# Patient Record
Sex: MALE | Race: ASIAN | NOT HISPANIC OR LATINO
[De-identification: names, ages, dates, MRNs, and addresses within clinical notes are randomized per-mention and may not be internally consistent; named-entity substitution may affect disease eponyms.]

---

## 2022-12-01 PROBLEM — Z00.00 ENCOUNTER FOR PREVENTIVE HEALTH EXAMINATION: Status: ACTIVE | Noted: 2022-12-01

## 2022-12-05 ENCOUNTER — NON-APPOINTMENT (OUTPATIENT)
Age: 72
End: 2022-12-05

## 2022-12-05 ENCOUNTER — APPOINTMENT (OUTPATIENT)
Dept: HEART AND VASCULAR | Facility: CLINIC | Age: 72
End: 2022-12-05

## 2022-12-05 VITALS
HEIGHT: 67 IN | BODY MASS INDEX: 25.9 KG/M2 | DIASTOLIC BLOOD PRESSURE: 50 MMHG | SYSTOLIC BLOOD PRESSURE: 136 MMHG | WEIGHT: 165 LBS | OXYGEN SATURATION: 98 % | TEMPERATURE: 96.4 F | HEART RATE: 38 BPM

## 2022-12-05 DIAGNOSIS — Z87.891 PERSONAL HISTORY OF NICOTINE DEPENDENCE: ICD-10-CM

## 2022-12-05 DIAGNOSIS — Z83.3 FAMILY HISTORY OF DIABETES MELLITUS: ICD-10-CM

## 2022-12-05 PROCEDURE — 99204 OFFICE O/P NEW MOD 45 MIN: CPT | Mod: 25

## 2022-12-05 PROCEDURE — 93000 ELECTROCARDIOGRAM COMPLETE: CPT

## 2022-12-05 RX ORDER — ROSUVASTATIN CALCIUM 10 MG/1
10 TABLET, FILM COATED ORAL
Qty: 90 | Refills: 0 | Status: COMPLETED | COMMUNITY
Start: 2022-11-03

## 2022-12-05 RX ORDER — INSULIN GLARGINE 100 [IU]/ML
INJECTION, SOLUTION SUBCUTANEOUS
Refills: 0 | Status: ACTIVE | COMMUNITY

## 2022-12-05 RX ORDER — METOPROLOL SUCCINATE 25 MG/1
25 TABLET, EXTENDED RELEASE ORAL
Qty: 90 | Refills: 0 | Status: COMPLETED | COMMUNITY
Start: 2022-11-03

## 2022-12-05 RX ORDER — INSULIN GLARGINE 100 [IU]/ML
100 INJECTION, SOLUTION SUBCUTANEOUS
Qty: 15 | Refills: 0 | Status: COMPLETED | COMMUNITY
Start: 2022-11-17

## 2022-12-05 RX ORDER — LANCETS 30 GAUGE
EACH MISCELLANEOUS
Qty: 100 | Refills: 0 | Status: COMPLETED | COMMUNITY
Start: 2022-08-19

## 2022-12-05 RX ORDER — BLOOD SUGAR DIAGNOSTIC
STRIP MISCELLANEOUS
Qty: 100 | Refills: 0 | Status: COMPLETED | COMMUNITY
Start: 2022-08-19

## 2022-12-05 RX ORDER — AMMONIUM LACTATE 12 %
12 CREAM (GRAM) TOPICAL
Qty: 385 | Refills: 0 | Status: COMPLETED | COMMUNITY
Start: 2022-08-09

## 2022-12-05 NOTE — PHYSICAL EXAM
[Well Developed] : well developed [Well Nourished] : well nourished [No Acute Distress] : no acute distress [Normal Conjunctiva] : normal conjunctiva [Normal Venous Pressure] : normal venous pressure [No Carotid Bruit] : no carotid bruit [Normal S1, S2] : normal S1, S2 [No Murmur] : no murmur [No Rub] : no rub [No Gallop] : no gallop [5th Left ICS - MCL] : palpated at the 5th LICS in the midclavicular line [Normal] : normal [Bradycardia] : bradycardic [Premature Beats] : regular with premature beats [Normal S1] : normal S1 [Normal S2] : normal S2 [Clear Lung Fields] : clear lung fields [Good Air Entry] : good air entry [No Respiratory Distress] : no respiratory distress  [Soft] : abdomen soft [Non Tender] : non-tender [No Masses/organomegaly] : no masses/organomegaly [Normal Bowel Sounds] : normal bowel sounds [Normal Gait] : normal gait [No Edema] : no edema [No Cyanosis] : no cyanosis [No Clubbing] : no clubbing [No Varicosities] : no varicosities [No Rash] : no rash [No Skin Lesions] : no skin lesions [Moves all extremities] : moves all extremities [No Focal Deficits] : no focal deficits [Normal Speech] : normal speech [Alert and Oriented] : alert and oriented [Normal memory] : normal memory

## 2022-12-05 NOTE — CARDIOLOGY SUMMARY
[de-identified] : 12/5/22 SB @ 45 bpm, RBBB, LAFB, blocked PACs [de-identified] : Lorelei HOLLOWAY 8/[birth year] - 9/2/22: SR/SB with PACs with aberrant conduction

## 2022-12-05 NOTE — DISCUSSION/SUMMARY
[FreeTextEntry1] : Mr. Knight is a pleasant 72 year-old gentleman with a past medical history significant for HTN, HLD, DM II, bifascicular block on ECG and frequent PACs who presents for initial evaluation.  His ECG today is significant for RBBB, LAFB and blocked PACs with a long coupling interval.  Given his degree of conduction disease, I recommended that we proceed with a permanent pacemaker as soon as possible given the risk for progression of AV block.  We discussed the procedure in detail including risks, benefits and alternatives. Risks of the procedure have been discussed with the patient including but not limited to: bleeding/pocket hematoma, phlebitis/thrombophlebitis, lead dislodgment, PPM and/or lead infection, device malfunction, implantation site discomfort, pneumothorax, hemothorax, myocardial perforation, anaphylaxis, air embolism, infection, skin erosion, lead fracture, pectoral muscle stimulation, intercostal or diaphragmatic pacing, vascular thrombosis, endocarditis. Risks and benefits of the possible use of medication for conscious/deep sedation have been explained to the patient.  \par \par Recent stress test and echo requested from Dr. Weston.  I advised him to post pone his upcoming travel and explained the risk for progression of AV block resulting in presyncope/syncope that may potentially result in trauma.  He will call the office when he is ready to proceed.  All questions were answered to his apparent satisfaction.  \par \par

## 2022-12-05 NOTE — ADDENDUM
[FreeTextEntry1] : I, Monserrat Crews, hereby attest that the medical record entry for this patient accurately reflects signatures/notations that I made on the Date of Service in my capacity as an Attending Physician when I treated/diagnosed the above patient. I do hereby attest that this information is true, accurate and complete to the best of my knowledge.  I was present for the entire visit and supervised the entire visit and made/agree with the plan as outlined.\par

## 2022-12-05 NOTE — HISTORY OF PRESENT ILLNESS
[FreeTextEntry1] : Mr. Knight is a pleasant 72 year-old gentleman with a past medical history significant for HTN, HLD, DM II, bifascicular block on ECG and frequent PACs who presents for initial evaluation.\par \par Started on Metoprolol ~ 2 months ago for management of palpitations; noted to have frequent PACs on ambulatory telemetry.  Metoprolol was discontinued three weeks ago due to bradycardia in the 40 bpm range.  Notes fatigue over the last several months, worse while on Metoprolol.  He endorses MUKHEREJE with stairs/hills.  No history of presyncope/syncope. \par \par Reports recent stress test was normal.

## 2022-12-23 ENCOUNTER — NON-APPOINTMENT (OUTPATIENT)
Age: 72
End: 2022-12-23

## 2022-12-23 ENCOUNTER — APPOINTMENT (OUTPATIENT)
Dept: ELECTROPHYSIOLOGY | Facility: CLINIC | Age: 72
End: 2022-12-23

## 2022-12-23 VITALS
HEIGHT: 67 IN | BODY MASS INDEX: 25.9 KG/M2 | RESPIRATION RATE: 14 BRPM | SYSTOLIC BLOOD PRESSURE: 166 MMHG | WEIGHT: 165 LBS | DIASTOLIC BLOOD PRESSURE: 63 MMHG | HEART RATE: 46 BPM | OXYGEN SATURATION: 98 %

## 2022-12-23 DIAGNOSIS — I44.7 LEFT BUNDLE-BRANCH BLOCK, UNSPECIFIED: ICD-10-CM

## 2022-12-23 DIAGNOSIS — N20.0 CALCULUS OF KIDNEY: ICD-10-CM

## 2022-12-23 DIAGNOSIS — Z78.9 OTHER SPECIFIED HEALTH STATUS: ICD-10-CM

## 2022-12-23 DIAGNOSIS — I45.10 UNSPECIFIED RIGHT BUNDLE-BRANCH BLOCK: ICD-10-CM

## 2022-12-23 DIAGNOSIS — Z63.4 DISAPPEARANCE AND DEATH OF FAMILY MEMBER: ICD-10-CM

## 2022-12-23 PROCEDURE — 93000 ELECTROCARDIOGRAM COMPLETE: CPT

## 2022-12-23 PROCEDURE — 99204 OFFICE O/P NEW MOD 45 MIN: CPT | Mod: 25

## 2022-12-23 RX ORDER — PNV NO.95/FERROUS FUM/FOLIC AC 28MG-0.8MG
TABLET ORAL
Refills: 0 | Status: ACTIVE | COMMUNITY

## 2022-12-23 RX ORDER — AMLODIPINE BESYLATE 5 MG/1
5 TABLET ORAL DAILY
Qty: 90 | Refills: 0 | Status: ACTIVE | COMMUNITY
Start: 2022-11-29

## 2022-12-23 RX ORDER — ROSUVASTATIN CALCIUM 5 MG/1
5 TABLET, FILM COATED ORAL DAILY
Qty: 90 | Refills: 3 | Status: ACTIVE | COMMUNITY
Start: 2022-11-26

## 2022-12-23 RX ORDER — SITAGLIPTIN 100 MG/1
100 TABLET, FILM COATED ORAL DAILY
Refills: 0 | Status: ACTIVE | COMMUNITY
Start: 2022-11-03

## 2022-12-23 RX ORDER — LOSARTAN POTASSIUM 100 MG/1
100 TABLET, FILM COATED ORAL DAILY
Qty: 90 | Refills: 3 | Status: ACTIVE | COMMUNITY
Start: 2022-12-01

## 2022-12-23 RX ORDER — GLIPIZIDE 10 MG/1
10 TABLET ORAL
Refills: 0 | Status: ACTIVE | COMMUNITY
Start: 2022-11-03

## 2022-12-23 RX ORDER — METFORMIN HYDROCHLORIDE 1000 MG/1
1000 TABLET, COATED ORAL TWICE DAILY
Refills: 0 | Status: ACTIVE | COMMUNITY
Start: 2022-11-03

## 2022-12-23 SDOH — SOCIAL STABILITY - SOCIAL INSECURITY: DISSAPEARANCE AND DEATH OF FAMILY MEMBER: Z63.4

## 2022-12-23 NOTE — HISTORY OF PRESENT ILLNESS
[FreeTextEntry1] : Mr. Chapin Knight is a 72 year old man with hypertension, hyperlipidemia, diabetes who presents today for an initial evaluation for a pacemaker. The patient was treated with Metoprolol for palpitations previously. This medication was stopped due to sinus bradycardia in the 40s. The patient recalls that he was more fatigued while on Metoprolol.  We was noted to have evidence of sinus bradycardia, premature atrial contractions, a first degree AV block, a complete right bundle branch block and a left axis deviation consistent with multilevel conduction system disease. Per documentation from Dr. Weston's office the patient also has had prior evidence of a left bundle branch block.\par \par The patient reports that he occasionally feels lightheaded when raising from a seated position. She also endorses shortness of breath when walking up stairs. No chest pain, near-syncope or syncope. \par \par Vitals from this visit: BP: 166/63, HR: 46, RR: 18, SpO2: 98% on RA

## 2022-12-23 NOTE — CARDIOLOGY SUMMARY
[de-identified] : ECG from 12/23/2022: Normal sinus rhythm with 2:1 AV block, right bundle branch block, RI: 218ms on conducted beats\par ECG from 12/5/2022: Sinus bradycardia with atrial bigeminy, right bundle branch block (QRSd: 140), RI: 212, left axis deviation\par ECG from 11/11/2022: Sinus shonna at 48 with RI: 256, RBBB (QRSd: 132ms), normal axis [de-identified] : [employer] from 8/27/2022-9/2/2022: min HR: 50, mean HR: 85, max HR: 140, no patient triggered events, Mobitz I AV block occurred, 2% APCs, 12% PVCs [de-identified] : Stress test from 12/6/2022: Negative ECG stress test for ischemia after pharmacologic stress, equivocal myocardial perfusion imaging, overall normal LVSF without regional WMAs [de-identified] : TTE from 8/6/2022: EF: 67%, trace TR, normal LA and RA, no ASD, normal RVSF, no pericardial effusion

## 2022-12-23 NOTE — DISCUSSION/SUMMARY
[FreeTextEntry1] : Mr. Chapin Knight is a 72 year old man with HTN, HLD, DM2 who presents today for an initial evaluation for a pacemaker. He has a reported history of a LBBB and was found to have 2:1 AV block with a RBBB today (similar finding to when he saw Dr. Crews on December 5th). I agree with Dr. Crews's assessment that he would benefit from a pacemaker. We discussed the urgency of the device in light of the left and right bundle branch block. He was resistant to going to the ED today. We will schedule him for a PPM next week. If he has any episodes of syncope or pre-syncope he will go to the closest hospital. \par \par I spoke with Dr. Weston, plan for a 2-ch BSC PPM (using extended longevity device given patient's young age), her office with follow the device. Once the PPM is implanted can consider resumption of the B-blocker that Dr. Weston was using for palpitations. \par \par We discussed the risks, benefits and alternatives of a pacemaker implantation. Risks including, but not limited to bleeding, infection, VTE, pneumothorax, perforation, lead dislodgement.  [EKG obtained to assist in diagnosis and management of assessed problem(s)] : EKG obtained to assist in diagnosis and management of assessed problem(s)

## 2022-12-23 NOTE — PHYSICAL EXAM
[Well Developed] : well developed [Well Nourished] : well nourished [No Acute Distress] : no acute distress [Normal Venous Pressure] : normal venous pressure [No Carotid Bruit] : no carotid bruit [Normal S1, S2] : normal S1, S2 [No Murmur] : no murmur [No Rub] : no rub [No Gallop] : no gallop [Clear Lung Fields] : clear lung fields [Good Air Entry] : good air entry [No Respiratory Distress] : no respiratory distress  [Soft] : abdomen soft [Non Tender] : non-tender [No Masses/organomegaly] : no masses/organomegaly [Normal Bowel Sounds] : normal bowel sounds [Normal Gait] : normal gait [No Edema] : no edema [No Cyanosis] : no cyanosis [No Clubbing] : no clubbing [No Varicosities] : no varicosities [No Rash] : no rash [No Skin Lesions] : no skin lesions [Moves all extremities] : moves all extremities [Normal Speech] : normal speech [Alert and Oriented] : alert and oriented [Normal memory] : normal memory [de-identified] : Zane, regular

## 2022-12-27 ENCOUNTER — OUTPATIENT (OUTPATIENT)
Dept: OUTPATIENT SERVICES | Facility: HOSPITAL | Age: 72
LOS: 1 days | End: 2022-12-27
Payer: COMMERCIAL

## 2022-12-27 ENCOUNTER — NON-APPOINTMENT (OUTPATIENT)
Age: 72
End: 2022-12-27

## 2022-12-27 DIAGNOSIS — Z11.52 ENCOUNTER FOR SCREENING FOR COVID-19: ICD-10-CM

## 2022-12-27 LAB — SARS-COV-2 RNA SPEC QL NAA+PROBE: SIGNIFICANT CHANGE UP

## 2022-12-27 PROCEDURE — C9803: CPT

## 2022-12-27 PROCEDURE — U0005: CPT

## 2022-12-27 PROCEDURE — U0003: CPT

## 2022-12-30 ENCOUNTER — INPATIENT (INPATIENT)
Facility: HOSPITAL | Age: 72
LOS: 0 days | Discharge: HOME | DRG: 243 | End: 2022-12-30
Attending: STUDENT IN AN ORGANIZED HEALTH CARE EDUCATION/TRAINING PROGRAM | Admitting: STUDENT IN AN ORGANIZED HEALTH CARE EDUCATION/TRAINING PROGRAM
Payer: COMMERCIAL

## 2022-12-30 ENCOUNTER — TRANSCRIPTION ENCOUNTER (OUTPATIENT)
Age: 72
End: 2022-12-30

## 2022-12-30 VITALS
OXYGEN SATURATION: 97 % | SYSTOLIC BLOOD PRESSURE: 153 MMHG | DIASTOLIC BLOOD PRESSURE: 68 MMHG | RESPIRATION RATE: 18 BRPM | HEART RATE: 67 BPM | TEMPERATURE: 98 F

## 2022-12-30 VITALS — WEIGHT: 164.91 LBS | HEIGHT: 67 IN

## 2022-12-30 DIAGNOSIS — I44.2 ATRIOVENTRICULAR BLOCK, COMPLETE: ICD-10-CM

## 2022-12-30 DIAGNOSIS — Z90.89 ACQUIRED ABSENCE OF OTHER ORGANS: Chronic | ICD-10-CM

## 2022-12-30 DIAGNOSIS — Z98.890 OTHER SPECIFIED POSTPROCEDURAL STATES: Chronic | ICD-10-CM

## 2022-12-30 LAB
ANION GAP SERPL CALC-SCNC: 11 MMOL/L — SIGNIFICANT CHANGE UP (ref 5–17)
APTT BLD: 30.8 SEC — SIGNIFICANT CHANGE UP (ref 27.5–35.5)
BLD GP AB SCN SERPL QL: NEGATIVE — SIGNIFICANT CHANGE UP
BUN SERPL-MCNC: 17 MG/DL — SIGNIFICANT CHANGE UP (ref 7–23)
CALCIUM SERPL-MCNC: 9.8 MG/DL — SIGNIFICANT CHANGE UP (ref 8.4–10.5)
CHLORIDE SERPL-SCNC: 107 MMOL/L — SIGNIFICANT CHANGE UP (ref 96–108)
CO2 SERPL-SCNC: 24 MMOL/L — SIGNIFICANT CHANGE UP (ref 22–31)
CREAT SERPL-MCNC: 1.14 MG/DL — SIGNIFICANT CHANGE UP (ref 0.5–1.3)
EGFR: 69 ML/MIN/1.73M2 — SIGNIFICANT CHANGE UP
GLUCOSE BLDC GLUCOMTR-MCNC: 123 MG/DL — HIGH (ref 70–99)
GLUCOSE BLDC GLUCOMTR-MCNC: 133 MG/DL — HIGH (ref 70–99)
GLUCOSE SERPL-MCNC: 149 MG/DL — HIGH (ref 70–99)
HCT VFR BLD CALC: 42.6 % — SIGNIFICANT CHANGE UP (ref 39–50)
HGB BLD-MCNC: 13.8 G/DL — SIGNIFICANT CHANGE UP (ref 13–17)
INR BLD: 0.98 RATIO — SIGNIFICANT CHANGE UP (ref 0.88–1.16)
MCHC RBC-ENTMCNC: 30.5 PG — SIGNIFICANT CHANGE UP (ref 27–34)
MCHC RBC-ENTMCNC: 32.4 GM/DL — SIGNIFICANT CHANGE UP (ref 32–36)
MCV RBC AUTO: 94.2 FL — SIGNIFICANT CHANGE UP (ref 80–100)
NRBC # BLD: 0 /100 WBCS — SIGNIFICANT CHANGE UP (ref 0–0)
PLATELET # BLD AUTO: 215 K/UL — SIGNIFICANT CHANGE UP (ref 150–400)
POTASSIUM SERPL-MCNC: 4.6 MMOL/L — SIGNIFICANT CHANGE UP (ref 3.5–5.3)
POTASSIUM SERPL-SCNC: 4.6 MMOL/L — SIGNIFICANT CHANGE UP (ref 3.5–5.3)
PROTHROM AB SERPL-ACNC: 11.3 SEC — SIGNIFICANT CHANGE UP (ref 10.5–13.4)
RBC # BLD: 4.52 M/UL — SIGNIFICANT CHANGE UP (ref 4.2–5.8)
RBC # FLD: 14 % — SIGNIFICANT CHANGE UP (ref 10.3–14.5)
RH IG SCN BLD-IMP: POSITIVE — SIGNIFICANT CHANGE UP
RH IG SCN BLD-IMP: POSITIVE — SIGNIFICANT CHANGE UP
SODIUM SERPL-SCNC: 142 MMOL/L — SIGNIFICANT CHANGE UP (ref 135–145)
WBC # BLD: 9.05 K/UL — SIGNIFICANT CHANGE UP (ref 3.8–10.5)
WBC # FLD AUTO: 9.05 K/UL — SIGNIFICANT CHANGE UP (ref 3.8–10.5)

## 2022-12-30 PROCEDURE — 71046 X-RAY EXAM CHEST 2 VIEWS: CPT | Mod: 26

## 2022-12-30 PROCEDURE — C1892: CPT

## 2022-12-30 PROCEDURE — C1889: CPT

## 2022-12-30 PROCEDURE — 33208 INSRT HEART PM ATRIAL & VENT: CPT | Mod: KX

## 2022-12-30 PROCEDURE — C1785: CPT

## 2022-12-30 PROCEDURE — 36415 COLL VENOUS BLD VENIPUNCTURE: CPT

## 2022-12-30 PROCEDURE — 85027 COMPLETE CBC AUTOMATED: CPT

## 2022-12-30 PROCEDURE — C1769: CPT

## 2022-12-30 PROCEDURE — 85610 PROTHROMBIN TIME: CPT

## 2022-12-30 PROCEDURE — 80048 BASIC METABOLIC PNL TOTAL CA: CPT

## 2022-12-30 PROCEDURE — 86850 RBC ANTIBODY SCREEN: CPT

## 2022-12-30 PROCEDURE — 71046 X-RAY EXAM CHEST 2 VIEWS: CPT

## 2022-12-30 PROCEDURE — 86901 BLOOD TYPING SEROLOGIC RH(D): CPT

## 2022-12-30 PROCEDURE — C1887: CPT

## 2022-12-30 PROCEDURE — 82962 GLUCOSE BLOOD TEST: CPT

## 2022-12-30 PROCEDURE — 85730 THROMBOPLASTIN TIME PARTIAL: CPT

## 2022-12-30 PROCEDURE — 86900 BLOOD TYPING SEROLOGIC ABO: CPT

## 2022-12-30 PROCEDURE — C1898: CPT

## 2022-12-30 RX ORDER — ROSUVASTATIN CALCIUM 5 MG/1
1 TABLET ORAL
Qty: 0 | Refills: 0 | DISCHARGE

## 2022-12-30 RX ORDER — INSULIN GLARGINE 100 [IU]/ML
18 INJECTION, SOLUTION SUBCUTANEOUS
Qty: 0 | Refills: 0 | DISCHARGE

## 2022-12-30 RX ORDER — CHLORHEXIDINE GLUCONATE 213 G/1000ML
1 SOLUTION TOPICAL ONCE
Refills: 0 | Status: DISCONTINUED | OUTPATIENT
Start: 2022-12-30 | End: 2022-12-30

## 2022-12-30 RX ORDER — OXYCODONE AND ACETAMINOPHEN 5; 325 MG/1; MG/1
1 TABLET ORAL EVERY 6 HOURS
Refills: 0 | Status: DISCONTINUED | OUTPATIENT
Start: 2022-12-30 | End: 2022-12-30

## 2022-12-30 RX ORDER — SITAGLIPTIN 50 MG/1
1 TABLET, FILM COATED ORAL
Qty: 0 | Refills: 0 | DISCHARGE

## 2022-12-30 RX ORDER — LOSARTAN POTASSIUM 100 MG/1
1 TABLET, FILM COATED ORAL
Qty: 0 | Refills: 0 | DISCHARGE

## 2022-12-30 RX ORDER — CEFAZOLIN SODIUM 1 G
2000 VIAL (EA) INJECTION ONCE
Refills: 0 | Status: DISCONTINUED | OUTPATIENT
Start: 2022-12-30 | End: 2022-12-30

## 2022-12-30 RX ORDER — PREGABALIN 225 MG/1
1 CAPSULE ORAL
Qty: 0 | Refills: 0 | DISCHARGE

## 2022-12-30 RX ORDER — CEPHALEXIN 500 MG
1 CAPSULE ORAL
Qty: 8 | Refills: 0
Start: 2022-12-30 | End: 2022-12-31

## 2022-12-30 RX ORDER — METFORMIN HYDROCHLORIDE 850 MG/1
1 TABLET ORAL
Qty: 0 | Refills: 0 | DISCHARGE

## 2022-12-30 RX ORDER — OXYCODONE AND ACETAMINOPHEN 5; 325 MG/1; MG/1
1 TABLET ORAL
Qty: 12 | Refills: 0
Start: 2022-12-30 | End: 2023-01-01

## 2022-12-30 RX ORDER — AMLODIPINE BESYLATE 2.5 MG/1
1 TABLET ORAL
Qty: 0 | Refills: 0 | DISCHARGE

## 2022-12-30 RX ORDER — CEPHALEXIN 500 MG
250 CAPSULE ORAL EVERY 6 HOURS
Refills: 0 | Status: DISCONTINUED | OUTPATIENT
Start: 2022-12-30 | End: 2022-12-30

## 2022-12-30 RX ADMIN — Medication 250 MILLIGRAM(S): at 19:40

## 2022-12-30 NOTE — PATIENT PROFILE ADULT - FALL HARM RISK - UNIVERSAL INTERVENTIONS
Bed in lowest position, wheels locked, appropriate side rails in place/Call bell, personal items and telephone in reach/Instruct patient to call for assistance before getting out of bed or chair/Non-slip footwear when patient is out of bed/[place] to call system/Physically safe environment - no spills, clutter or unnecessary equipment/Purposeful Proactive Rounding/Room/bathroom lighting operational, light cord in reach

## 2022-12-30 NOTE — H&P CARDIOLOGY - NSICDXPASTMEDICALHX_GEN_ALL_CORE_FT
PAST MEDICAL HISTORY:  DM type 2, not at goal     HLD (hyperlipidemia)     HTN (hypertension)     Second degree heart block

## 2022-12-30 NOTE — H&P CARDIOLOGY - HISTORY OF PRESENT ILLNESS
Mr. Chapin Knight is a 72 year old man with HTN, HLD, DM2 who presents today for an initial evaluation for a pacemaker. He has a reported history of a LBBB and was found to have 2:1 AV block with a RBBB today (similar finding to when he saw Dr. Crews on December 5th). I agree with Dr. Crews's assessment that he would benefit from a pacemaker. We discussed the urgency of the device in light of the left and right bundle branch block. He was resistant to going to the ED today. We will schedule him for a PPM next week. If he has any episodes of syncope or pre-syncope he will go to the closest hospital.     I spoke with Dr. Weston, plan for a 2-ch C PPM (using extended longevity device given patient's young age), her office with follow the device. Once the PPM is implanted can consider resumption of the B-blocker that Dr. Weston was using for palpitations.     We discussed the risks, benefits and alternatives of a pacemaker implantation. Risks including, but not limited to bleeding, infection, VTE, pneumothorax, perforation, lead dislodgement.       EKG obtained to assist in diagnosis and management of assessed problem(s).      72 year old man with HTN, HLD, DM2, RBBB,  who presents today for dual chmaber pacemaker. Patient was recently diagnosed with second degree HB, pt denies chest pain, SOB/ syncope. Seen by Dr. Harman Wood and referred for PM implant.        72 year old man with HTN, HLD, DM2, RBBB,  who presents today for dual chmaber pacemaker. Patient was recently diagnosed with second degree HB, pt denies chest pain, SOB/ syncope, but c/o lightheadedness, especially when he climb stairs. Seen by Dr. Harman Wood and referred for PM implant.   Denies any implanted cardiac device.

## 2022-12-30 NOTE — ASU DISCHARGE PLAN (ADULT/PEDIATRIC) - PROVIDER TOKENS
PROVIDER:[TOKEN:[[id number]],SCHEDULEDAPPT:[01/18/2023],SCHEDULEDAPPTTIME:[11:40 AM],ESTABLISHEDPATIENT:[T]]

## 2022-12-30 NOTE — ASU DISCHARGE PLAN (ADULT/PEDIATRIC) - NS MD DC FALL RISK RISK
For information on Fall & Injury Prevention, visit: [email or web address] OR  [email or web address] OR  https://www.cdc.gov/steadi/patient.html

## 2022-12-30 NOTE — H&P CARDIOLOGY - NS ATTEND AMEND GEN_ALL_CORE FT
Patient seen at bedside in [facility]. I saw the patient in the office 1 week ago today. No changes in history in the past week. Consented for 2-ch PPM - risks including, but not limited to bleeding, infection, pneumothorax, perforation, lead dislodgement, pain.     AJAY Yates

## 2022-12-30 NOTE — ASU DISCHARGE PLAN (ADULT/PEDIATRIC) - ASU DC SPECIAL INSTRUCTIONSFT
WOUND CARE:  Do NOT scrub, rub, or pick at your incision site  AFTER 3 DAYS you may SHOWER  - use mild soap and gentle warm, water stream, pat dry  DO NOT apply lotions, creams, ointments, powder, perfumes to your incision site  DO NOT SOAK your site for 4-6 weeks ( no baths, no pools, no tubs, etc...)  wear loose clothing around site for 1-2 weeks  IF surgical tape was used DO NOT remove the strips, they will fall off after 7days, if glue was used, it will naturally fall off within 3 weeks  if staples were used, they will be removed in 7-10 days by your doctor    ACTIVITY:  for 2 weeks AFTER  your procedure  - DO NOT RAISE your arm above shoulder level ( on the same side of your incision)  for 4 weeks AFTER your procedure   - DO NOT LIFT anything 10 lbs or heavier ( on the side of your implant)   - certain activities may be limited longer, those that involve swinging your arm, and will be discussed with your EP doctor  DO NOT DRIVE until your EP Doctor or nurse practitioner/ physician assistant states it is safe to do so  A follow up appointment in 7-14 days will be arranged before your discharge    ID CARD:   you will receive an ID CARD and device company booklet   - please carry that card with you at all times    ***CALL YOUR DOCTOR ***  IF you have fever, chills, body aches, or severe pain, swelling, redness, heat, yellow drainage from your incision site  IF bleeding  or significant new swelling from your puncture site  IF your experience lightheadedness, dizziness, or fainting spell.  IF unable to ge tin contact with your doctor, you may call the Cardiology Office at [facility] at [phone] WOUND CARE:  Do NOT scrub, rub, or pick at your incision site  AFTER 1 DAY you may SHOWER  - use mild soap and gentle warm, water stream, pat dry  DO NOT apply lotions, creams, ointments, powder, perfumes to your incision site  DO NOT SOAK your site for 4 weeks ( no baths, no pools, no tubs, etc...)  wear loose clothing around site for 1-2 weeks  IF surgical tape was used DO NOT remove the strips, they will fall off after 7days, if glue was used, it will naturally fall off within 3 weeks    ACTIVITY:  for 2 weeks AFTER  your procedure  - DO NOT RAISE your arm above shoulder level ( on the same side of your incision)  for 4 weeks AFTER your procedure   - DO NOT LIFT anything 10 lbs or heavier ( on the side of your implant)   - certain activities may be limited longer, those that involve swinging your arm, and will be discussed with your EP doctor  DO NOT DRIVE until your EP Doctor or nurse practitioner/ physician assistant states it is safe to do so  A follow up appointment in 7-14 days will be arranged before your discharge    ID CARD:   you will receive an ID CARD and device company booklet   - please carry that card with you at all times    ***CALL YOUR DOCTOR ***  IF you have fever, chills, body aches, or severe pain, swelling, redness, heat, yellow drainage from your incision site  IF bleeding  or significant new swelling from your puncture site  IF your experience lightheadedness, dizziness, or fainting spell.  IF unable to ge tin contact with your doctor, you may call the Cardiology Office at [facility] at [phone]

## 2022-12-31 ENCOUNTER — NON-APPOINTMENT (OUTPATIENT)
Age: 72
End: 2022-12-31

## 2023-01-04 PROBLEM — E11.9 TYPE 2 DIABETES MELLITUS WITHOUT COMPLICATIONS: Chronic | Status: ACTIVE | Noted: 2022-12-30

## 2023-01-04 PROBLEM — I44.1 ATRIOVENTRICULAR BLOCK, SECOND DEGREE: Chronic | Status: ACTIVE | Noted: 2022-12-30

## 2023-01-04 PROBLEM — I10 ESSENTIAL (PRIMARY) HYPERTENSION: Chronic | Status: ACTIVE | Noted: 2022-12-30

## 2023-01-04 PROBLEM — E78.5 HYPERLIPIDEMIA, UNSPECIFIED: Chronic | Status: ACTIVE | Noted: 2022-12-30

## 2023-01-18 ENCOUNTER — RESULT CHARGE (OUTPATIENT)
Age: 73
End: 2023-01-18

## 2023-01-18 ENCOUNTER — APPOINTMENT (OUTPATIENT)
Dept: ELECTROPHYSIOLOGY | Facility: CLINIC | Age: 73
End: 2023-01-18
Payer: MEDICARE

## 2023-01-18 VITALS
HEART RATE: 86 BPM | HEIGHT: 67 IN | SYSTOLIC BLOOD PRESSURE: 132 MMHG | OXYGEN SATURATION: 97 % | WEIGHT: 165 LBS | BODY MASS INDEX: 25.9 KG/M2 | DIASTOLIC BLOOD PRESSURE: 71 MMHG

## 2023-01-18 PROCEDURE — 93280 PM DEVICE PROGR EVAL DUAL: CPT

## 2023-01-18 PROCEDURE — 99024 POSTOP FOLLOW-UP VISIT: CPT

## 2023-01-18 PROCEDURE — 93000 ELECTROCARDIOGRAM COMPLETE: CPT | Mod: 59

## 2023-01-19 ENCOUNTER — NON-APPOINTMENT (OUTPATIENT)
Age: 73
End: 2023-01-19

## 2023-01-30 ENCOUNTER — TRANSCRIPTION ENCOUNTER (OUTPATIENT)
Age: 73
End: 2023-01-30

## 2023-03-01 ENCOUNTER — APPOINTMENT (OUTPATIENT)
Dept: CARDIOLOGY | Facility: CLINIC | Age: 73
End: 2023-03-01
Payer: MEDICARE

## 2023-03-01 ENCOUNTER — NON-APPOINTMENT (OUTPATIENT)
Age: 73
End: 2023-03-01

## 2023-03-01 VITALS
DIASTOLIC BLOOD PRESSURE: 62 MMHG | BODY MASS INDEX: 26 KG/M2 | HEART RATE: 103 BPM | WEIGHT: 166 LBS | OXYGEN SATURATION: 96 % | SYSTOLIC BLOOD PRESSURE: 102 MMHG

## 2023-03-01 PROCEDURE — 93000 ELECTROCARDIOGRAM COMPLETE: CPT

## 2023-03-01 PROCEDURE — 99204 OFFICE O/P NEW MOD 45 MIN: CPT | Mod: 25

## 2023-03-01 NOTE — HISTORY OF PRESENT ILLNESS
[FreeTextEntry1] : Mr. Chapin Knight is a 72 year old man with hypertension, hyperlipidemia, diabetes who presents today for an initial evaluation after pacemaker implantation on 12/30/2022 at [facility]. Subsequent interrogation showed that he is 100% , but he is not pacemaker dependent. \par In October 2022, patient had palpitations for which he was prescribed metoprolol. This medication was stopped due to sinus bradycardia in the 40s. The patient recalls that he was more fatigued while on metoprolol. We was noted to have evidence of sinus bradycardia, premature atrial contractions, a first degree AV block, a complete right bundle branch block and a left axis deviation consistent with multilevel conduction system disease. Per documentation from Dr. Weston's office the patient also has had prior evidence of a left bundle branch block. PPM implanted electively at [facility] on 12/30/2022. \par \par Patient felt occasional light headedness at rest prior to his PPM implant, but he never had syncope.\par Since PPM implant, his palpitations have resolved, and he has remained off the metoprolol since prior to PPM implant. \par \par He walks for exercise. He does 3000 - 4000 steps per day, but he plans to increase this as the weather gets nicer.

## 2023-03-01 NOTE — CARDIOLOGY SUMMARY
[de-identified] : ECG from 12/23/2022: Normal sinus rhythm with 2:1 AV block, right bundle branch block, CT: 218ms on conducted beats\par ECG from 12/5/2022: Sinus bradycardia with atrial bigeminy, right bundle branch block (QRSd: 140), CT: 212, left axis deviation\par ECG from 11/11/2022: Sinus shonna at 48 with CT: 256, RBBB (QRSd: 132ms), normal axis [de-identified] : [employer] from 8/27/2022-9/2/2022: min HR: 50, mean HR: 85, max HR: 140, no patient triggered events, Mobitz I AV block occurred, 2% APCs, 12% PVCs [de-identified] : Stress test from 12/6/2022: Negative ECG stress test for ischemia after pharmacologic stress, equivocal myocardial perfusion imaging, overall normal LVSF without regional WMAs [de-identified] : TTE from 8/6/2022: EF: 67%, trace TR, normal LA and RA, no ASD, normal RVSF, no pericardial effusion [de-identified] : 12/30/2022 [employer] Accolade MRI EL dual chamber PPM placed by Gerson Yates MD at [facility]

## 2023-03-01 NOTE — DISCUSSION/SUMMARY
[EKG obtained to assist in diagnosis and management of assessed problem(s)] : EKG obtained to assist in diagnosis and management of assessed problem(s) [FreeTextEntry1] : Patient is a 72 year-old with cardiovascular risk factors as above and recent PPM placement for heart block.\par \par Continue diabetes management. Encouraged adding resistance training to his regimen with the goal of adding lean muscle to address elevated A1c despite polypharmacy. He is aware that he should not lift heavy weights because of the pacemaker, but light weights should be fine.

## 2023-04-19 ENCOUNTER — APPOINTMENT (OUTPATIENT)
Dept: ELECTROPHYSIOLOGY | Facility: HOSPITAL | Age: 73
End: 2023-04-19
Payer: MEDICARE

## 2023-04-19 ENCOUNTER — APPOINTMENT (OUTPATIENT)
Dept: ELECTROPHYSIOLOGY | Facility: CLINIC | Age: 73
End: 2023-04-19

## 2023-04-19 VITALS
DIASTOLIC BLOOD PRESSURE: 78 MMHG | HEIGHT: 67 IN | HEART RATE: 95 BPM | OXYGEN SATURATION: 98 % | SYSTOLIC BLOOD PRESSURE: 143 MMHG

## 2023-04-19 PROCEDURE — 93280 PM DEVICE PROGR EVAL DUAL: CPT | Mod: 26

## 2023-04-19 PROCEDURE — 93010 ELECTROCARDIOGRAM REPORT: CPT | Mod: 59

## 2023-04-24 ENCOUNTER — NON-APPOINTMENT (OUTPATIENT)
Age: 73
End: 2023-04-24

## 2023-07-19 ENCOUNTER — NON-APPOINTMENT (OUTPATIENT)
Age: 73
End: 2023-07-19

## 2023-07-19 ENCOUNTER — APPOINTMENT (OUTPATIENT)
Dept: ELECTROPHYSIOLOGY | Facility: CLINIC | Age: 73
End: 2023-07-19
Payer: MEDICARE

## 2023-07-19 PROCEDURE — 93294 REM INTERROG EVL PM/LDLS PM: CPT

## 2023-07-19 PROCEDURE — 93296 REM INTERROG EVL PM/IDS: CPT

## 2023-09-06 ENCOUNTER — NON-APPOINTMENT (OUTPATIENT)
Age: 73
End: 2023-09-06

## 2023-09-06 ENCOUNTER — APPOINTMENT (OUTPATIENT)
Dept: CARDIOLOGY | Facility: CLINIC | Age: 73
End: 2023-09-06
Payer: MEDICARE

## 2023-09-06 VITALS
OXYGEN SATURATION: 95 % | DIASTOLIC BLOOD PRESSURE: 69 MMHG | WEIGHT: 168 LBS | SYSTOLIC BLOOD PRESSURE: 109 MMHG | HEART RATE: 87 BPM | BODY MASS INDEX: 26.31 KG/M2

## 2023-09-06 DIAGNOSIS — I49.9 CARDIAC ARRHYTHMIA, UNSPECIFIED: ICD-10-CM

## 2023-09-06 PROCEDURE — 99214 OFFICE O/P EST MOD 30 MIN: CPT | Mod: 25

## 2023-09-06 PROCEDURE — 93000 ELECTROCARDIOGRAM COMPLETE: CPT

## 2023-09-06 NOTE — REASON FOR VISIT
[Arrhythmia/ECG Abnorrmalities] : arrhythmia/ECG abnormalities [FreeTextEntry1] : September 2023 - Patient returns today for follow-up in his usual state of health. He has no new complaints. Recent labs were within normal limits. A1c seen to be 7.4% and LDL 54 mg/dL.

## 2023-09-06 NOTE — HISTORY OF PRESENT ILLNESS
[FreeTextEntry1] : Mr. Chapin Knight is a 72 year old man with hypertension, hyperlipidemia, diabetes who presents today for an initial evaluation after pacemaker implantation on 12/30/2022 at [facility]. Subsequent interrogation showed that he is 100% , but he is not pacemaker dependent. \par  In October 2022, patient had palpitations for which he was prescribed metoprolol. This medication was stopped due to sinus bradycardia in the 40s. The patient recalls that he was more fatigued while on metoprolol. We was noted to have evidence of sinus bradycardia, premature atrial contractions, a first degree AV block, a complete right bundle branch block and a left axis deviation consistent with multilevel conduction system disease. Per documentation from Dr. Weston's office the patient also has had prior evidence of a left bundle branch block. PPM implanted electively at [facility] on 12/30/2022. \par  \par  Patient felt occasional light headedness at rest prior to his PPM implant, but he never had syncope.\par  Since PPM implant, his palpitations have resolved, and he has remained off the metoprolol since prior to PPM implant. \par  \par  He walks for exercise. He does 3000 - 4000 steps per day, but he plans to increase this as the weather gets nicer.

## 2023-09-06 NOTE — DISCUSSION/SUMMARY
[EKG obtained to assist in diagnosis and management of assessed problem(s)] : EKG obtained to assist in diagnosis and management of assessed problem(s) [FreeTextEntry1] : Patient is a 73 year-old with cardiovascular risk factors as above and PPM placement for heart block (December 2022).  Continue diabetes management. Encouraged adding resistance training to his regimen with the goal of adding lean muscle to address elevated A1c despite polypharmacy. He is aware that he should not lift heavy weights because of the pacemaker, but light weights should be fine.

## 2023-09-06 NOTE — CARDIOLOGY SUMMARY
[de-identified] : ECG from 12/23/2022: Normal sinus rhythm with 2:1 AV block, right bundle branch block, [place]: 218ms on conducted beats\par  ECG from 12/5/2022: Sinus bradycardia with atrial bigeminy, right bundle branch block (QRSd: 140), NY: 212, left axis deviation\par  ECG from 11/11/2022: Sinus shonna at 48 with NY: 256, RBBB (QRSd: 132ms), normal axis [de-identified] : [employer] from 8/27/2022-9/2/2022: min HR: 50, mean HR: 85, max HR: 140, no patient triggered events, Mobitz I AV block occurred, 2% APCs, 12% PVCs [de-identified] : Stress test from 12/6/2022: Negative ECG stress test for ischemia after pharmacologic stress, equivocal myocardial perfusion imaging, overall normal LVSF without regional WMAs [de-identified] : TTE from 8/6/2022: EF: 67%, trace TR, normal LA and RA, no ASD, normal RVSF, no pericardial effusion [de-identified] : 12/30/2022 [place] [employer] Accolade MRI EL dual chamber PPM placed by Gerson Yates MD at [facility]

## 2023-09-14 PROBLEM — I49.9 ARRHYTHMIA: Status: ACTIVE | Noted: 2022-12-23

## 2023-10-18 ENCOUNTER — APPOINTMENT (OUTPATIENT)
Dept: ELECTROPHYSIOLOGY | Facility: CLINIC | Age: 73
End: 2023-10-18
Payer: MEDICARE

## 2023-10-18 ENCOUNTER — NON-APPOINTMENT (OUTPATIENT)
Age: 73
End: 2023-10-18

## 2023-10-18 PROCEDURE — 93294 REM INTERROG EVL PM/LDLS PM: CPT

## 2023-10-18 PROCEDURE — 93296 REM INTERROG EVL PM/IDS: CPT

## 2024-01-17 ENCOUNTER — NON-APPOINTMENT (OUTPATIENT)
Age: 74
End: 2024-01-17

## 2024-01-17 ENCOUNTER — APPOINTMENT (OUTPATIENT)
Dept: ELECTROPHYSIOLOGY | Facility: CLINIC | Age: 74
End: 2024-01-17
Payer: MEDICARE

## 2024-01-17 PROCEDURE — 93294 REM INTERROG EVL PM/LDLS PM: CPT

## 2024-01-17 PROCEDURE — 93296 REM INTERROG EVL PM/IDS: CPT

## 2024-03-06 ENCOUNTER — NON-APPOINTMENT (OUTPATIENT)
Age: 74
End: 2024-03-06

## 2024-03-06 ENCOUNTER — APPOINTMENT (OUTPATIENT)
Dept: CARDIOLOGY | Facility: CLINIC | Age: 74
End: 2024-03-06
Payer: MEDICARE

## 2024-03-06 VITALS
SYSTOLIC BLOOD PRESSURE: 106 MMHG | HEART RATE: 96 BPM | HEIGHT: 67 IN | WEIGHT: 176 LBS | BODY MASS INDEX: 27.62 KG/M2 | DIASTOLIC BLOOD PRESSURE: 72 MMHG | OXYGEN SATURATION: 97 %

## 2024-03-06 DIAGNOSIS — E11.9 TYPE 2 DIABETES MELLITUS W/OUT COMPLICATIONS: ICD-10-CM

## 2024-03-06 DIAGNOSIS — E78.5 HYPERLIPIDEMIA, UNSPECIFIED: ICD-10-CM

## 2024-03-06 DIAGNOSIS — I10 ESSENTIAL (PRIMARY) HYPERTENSION: ICD-10-CM

## 2024-03-06 DIAGNOSIS — I44.30 UNSPECIFIED ATRIOVENTRICULAR BLOCK: ICD-10-CM

## 2024-03-06 PROCEDURE — 99214 OFFICE O/P EST MOD 30 MIN: CPT | Mod: 25

## 2024-03-06 PROCEDURE — 93000 ELECTROCARDIOGRAM COMPLETE: CPT

## 2024-03-06 PROCEDURE — G2211 COMPLEX E/M VISIT ADD ON: CPT | Mod: NC,1L

## 2024-03-06 NOTE — PHYSICAL EXAM
[Well Developed] : well developed [Well Nourished] : well nourished [No Acute Distress] : no acute distress [Normal Conjunctiva] : normal conjunctiva [Normal Venous Pressure] : normal venous pressure [No Carotid Bruit] : no carotid bruit [Normal S1, S2] : normal S1, S2 [No Rub] : no rub [No Murmur] : no murmur [No Gallop] : no gallop [Good Air Entry] : good air entry [Clear Lung Fields] : clear lung fields [No Respiratory Distress] : no respiratory distress  [Soft] : abdomen soft [Non Tender] : non-tender [No Masses/organomegaly] : no masses/organomegaly [Normal Bowel Sounds] : normal bowel sounds [Normal Gait] : normal gait [No Edema] : no edema [No Cyanosis] : no cyanosis [No Clubbing] : no clubbing [No Varicosities] : no varicosities [No Rash] : no rash [No Skin Lesions] : no skin lesions [Moves all extremities] : moves all extremities [No Focal Deficits] : no focal deficits [Normal Speech] : normal speech [Alert and Oriented] : alert and oriented [Normal memory] : normal memory

## 2024-03-08 PROBLEM — I44.30 HEART BLOCK, AV: Status: ACTIVE | Noted: 2022-12-23

## 2024-03-08 PROBLEM — I10 ESSENTIAL HYPERTENSION: Status: ACTIVE | Noted: 2022-12-23

## 2024-03-08 PROBLEM — E11.9 DIABETES MELLITUS, TYPE 2: Status: ACTIVE | Noted: 2022-12-23

## 2024-03-08 PROBLEM — E78.5 HYPERLIPIDEMIA: Status: ACTIVE | Noted: 2022-12-23

## 2024-03-08 NOTE — CARDIOLOGY SUMMARY
[de-identified] : ECG from 12/23/2022: Normal sinus rhythm with 2:1 AV block, right bundle branch block, [place]: 218ms on conducted beats\par  ECG from 12/5/2022: Sinus bradycardia with atrial bigeminy, right bundle branch block (QRSd: 140), NJ: 212, left axis deviation\par  ECG from 11/11/2022: Sinus shonna at 48 with NJ: 256, RBBB (QRSd: 132ms), normal axis [de-identified] : Stress test from 12/6/2022: Negative ECG stress test for ischemia after pharmacologic stress, equivocal myocardial perfusion imaging, overall normal LVSF without regional WMAs [de-identified] : [employer] from 8/27/2022-9/2/2022: min HR: 50, mean HR: 85, max HR: 140, no patient triggered events, Mobitz I AV block occurred, 2% APCs, 12% PVCs [de-identified] : 12/30/2022 [place] [employer] Accolade MRI EL dual chamber PPM placed by Gerson Yates MD at [facility] [de-identified] : TTE from 8/6/2022: EF: 67%, trace TR, normal LA and RA, no ASD, normal RVSF, no pericardial effusion

## 2024-03-08 NOTE — REASON FOR VISIT
[Arrhythmia/ECG Abnorrmalities] : arrhythmia/ECG abnormalities [FreeTextEntry1] : March 2024 - Patient returns today for follow-up in his usual state of health.  Recent labs showed elevated A1c at 8.3% and LDL that is well controlled. He recently returned from a trip to [place] with his two brothers. They are all in their 70s now.

## 2024-03-08 NOTE — HISTORY OF PRESENT ILLNESS
[FreeTextEntry1] : Mr. Chapin Knight is a 73 year old man with hypertension, hyperlipidemia, diabetes who presents today for an initial evaluation after pacemaker implantation on 12/30/2022 at [facility]. Subsequent interrogation showed that he is 100% , but he is not pacemaker dependent.  In October 2022, patient had palpitations for which he was prescribed metoprolol. This medication was stopped due to sinus bradycardia in the 40s. The patient recalls that he was more fatigued while on metoprolol. We was noted to have evidence of sinus bradycardia, premature atrial contractions, a first degree AV block, a complete right bundle branch block and a left axis deviation consistent with multilevel conduction system disease. Per documentation from Dr. Weston's office the patient also has had prior evidence of a left bundle branch block. PPM implanted electively at [facility] on 12/30/2022.   Patient felt occasional light headedness at rest prior to his PPM implant, but he never had syncope. Since PPM implant, his palpitations have resolved, and he has remained off the metoprolol since prior to PPM implant.   He walks for exercise. He does 3000 - 4000 steps per day, but he plans to increase this as the weather gets nicer.  September 2023 - Patient returns today for follow-up in his usual state of health. He has no new complaints. Recent labs were within normal limits. A1c seen to be 7.4% and LDL 54 mg/dL.

## 2024-04-24 ENCOUNTER — APPOINTMENT (OUTPATIENT)
Dept: ELECTROPHYSIOLOGY | Facility: CLINIC | Age: 74
End: 2024-04-24
Payer: MEDICARE

## 2024-04-24 ENCOUNTER — NON-APPOINTMENT (OUTPATIENT)
Age: 74
End: 2024-04-24

## 2024-04-24 VITALS
SYSTOLIC BLOOD PRESSURE: 129 MMHG | BODY MASS INDEX: 26.16 KG/M2 | DIASTOLIC BLOOD PRESSURE: 77 MMHG | OXYGEN SATURATION: 97 % | HEART RATE: 81 BPM | WEIGHT: 167 LBS

## 2024-04-24 PROCEDURE — 93280 PM DEVICE PROGR EVAL DUAL: CPT

## 2024-04-24 PROCEDURE — 93000 ELECTROCARDIOGRAM COMPLETE: CPT | Mod: 59

## 2024-07-23 ENCOUNTER — NON-APPOINTMENT (OUTPATIENT)
Age: 74
End: 2024-07-23

## 2024-07-24 ENCOUNTER — APPOINTMENT (OUTPATIENT)
Dept: ELECTROPHYSIOLOGY | Facility: CLINIC | Age: 74
End: 2024-07-24
Payer: MEDICARE

## 2024-07-24 PROCEDURE — 93296 REM INTERROG EVL PM/IDS: CPT

## 2024-07-24 PROCEDURE — 93294 REM INTERROG EVL PM/LDLS PM: CPT

## 2024-09-11 ENCOUNTER — NON-APPOINTMENT (OUTPATIENT)
Age: 74
End: 2024-09-11

## 2024-09-11 ENCOUNTER — APPOINTMENT (OUTPATIENT)
Dept: CARDIOLOGY | Facility: CLINIC | Age: 74
End: 2024-09-11
Payer: MEDICARE

## 2024-09-11 VITALS
HEART RATE: 85 BPM | OXYGEN SATURATION: 89 % | DIASTOLIC BLOOD PRESSURE: 85 MMHG | SYSTOLIC BLOOD PRESSURE: 121 MMHG | BODY MASS INDEX: 27.1 KG/M2 | WEIGHT: 173 LBS

## 2024-09-11 DIAGNOSIS — E11.9 TYPE 2 DIABETES MELLITUS W/OUT COMPLICATIONS: ICD-10-CM

## 2024-09-11 DIAGNOSIS — I10 ESSENTIAL (PRIMARY) HYPERTENSION: ICD-10-CM

## 2024-09-11 DIAGNOSIS — I44.30 UNSPECIFIED ATRIOVENTRICULAR BLOCK: ICD-10-CM

## 2024-09-11 DIAGNOSIS — E78.5 HYPERLIPIDEMIA, UNSPECIFIED: ICD-10-CM

## 2024-09-11 PROCEDURE — 99214 OFFICE O/P EST MOD 30 MIN: CPT | Mod: 25

## 2024-09-11 PROCEDURE — 93000 ELECTROCARDIOGRAM COMPLETE: CPT

## 2024-09-11 PROCEDURE — G2211 COMPLEX E/M VISIT ADD ON: CPT | Mod: NC

## 2024-09-12 NOTE — CARDIOLOGY SUMMARY
[de-identified] : ECG from 12/23/2022: Normal sinus rhythm with 2:1 AV block, right bundle branch block, IL: 218ms on conducted beats\par  ECG from 12/5/2022: Sinus bradycardia with atrial bigeminy, right bundle branch block (QRSd: 140), IL: 212, left axis deviation\par  ECG from 11/11/2022: Sinus shonna at 48 with IL: 256, RBBB (QRSd: 132ms), normal axis [de-identified] : [employer] from 8/27/2022-9/2/2022: min HR: 50, mean HR: 85, max HR: 140, no patient triggered events, Mobitz I AV block occurred, 2% APCs, 12% PVCs [de-identified] : Stress test from 12/6/2022: Negative ECG stress test for ischemia after pharmacologic stress, equivocal myocardial perfusion imaging, overall normal LVSF without regional WMAs [de-identified] : TTE from 8/6/2022: EF: 67%, trace TR, normal LA and RA, no ASD, normal RVSF, no pericardial effusion [de-identified] : 12/30/2022 [employer] Accolade MRI EL dual chamber PPM placed by Gerson Yates MD at [facility]

## 2024-09-12 NOTE — REASON FOR VISIT
[Arrhythmia/ECG Abnorrmalities] : arrhythmia/ECG abnormalities [FreeTextEntry1] : September 2024 - Patient returns today for follow-up in his usual state of health.  He walks for exercise. He does not do any resistance training.

## 2024-09-12 NOTE — HISTORY OF PRESENT ILLNESS
[FreeTextEntry1] : Mr. Chapin Knight is a 74 year old man with hypertension, hyperlipidemia, diabetes who presents today for an initial evaluation after pacemaker implantation on 12/30/2022 at [facility]. Subsequent interrogation showed that he is 100% , but he is not pacemaker dependent.  In October 2022, patient had palpitations for which he was prescribed metoprolol. This medication was stopped due to sinus bradycardia in the 40s. The patient recalls that he was more fatigued while on metoprolol. We was noted to have evidence of sinus bradycardia, premature atrial contractions, a first degree AV block, a complete right bundle branch block and a left axis deviation consistent with multilevel conduction system disease. Per documentation from Dr. Weston's office the patient also has had prior evidence of a left bundle branch block. PPM implanted electively at [facility] on 12/30/2022.   Patient felt occasional light headedness at rest prior to his PPM implant, but he never had syncope. Since PPM implant, his palpitations have resolved, and he has remained off the metoprolol since prior to PPM implant.   He walks for exercise. He does 3000 - 4000 steps per day, but he plans to increase this as the weather gets nicer.  September 2023 - Patient returns today for follow-up in his usual state of health. He has no new complaints. Recent labs were within normal limits. A1c seen to be 7.4% and LDL 54 mg/dL.  March 2024 - Patient returns today for follow-up in his usual state of health.  Recent labs showed elevated A1c at 8.3% and LDL that is well controlled. He recently returned from a trip to [place] with his two brothers. They are all in their 70s now.

## 2024-09-12 NOTE — DISCUSSION/SUMMARY
[EKG obtained to assist in diagnosis and management of assessed problem(s)] : EKG obtained to assist in diagnosis and management of assessed problem(s) [FreeTextEntry1] : Patient is a 74 year-old with cardiovascular risk factors as above and PPM placement for heart block (December 2022).  Continue diabetes management. Encouraged adding resistance training to his regimen with the goal of adding lean muscle to address elevated A1c despite polypharmacy.

## 2024-09-12 NOTE — HISTORY OF PRESENT ILLNESS
[FreeTextEntry1] : Mr. Chapin Knight is a 74 year old man with hypertension, hyperlipidemia, diabetes who presents today for an initial evaluation after pacemaker implantation on 12/30/2022 at [facility]. Subsequent interrogation showed that he is 100% , but he is not pacemaker dependent.  In October 2022, patient had palpitations for which he was prescribed metoprolol. This medication was stopped due to sinus bradycardia in the 40s. The patient recalls that he was more fatigued while on metoprolol. We was noted to have evidence of sinus bradycardia, premature atrial contractions, a first degree AV block, a complete right bundle branch block and a left axis deviation consistent with multilevel conduction system disease. Per documentation from Dr. Weston's office the patient also has had prior evidence of a left bundle branch block. PPM implanted electively at [facility] on 12/30/2022.   Patient felt occasional light headedness at rest prior to his PPM implant, but he never had syncope. Since PPM implant, his palpitations have resolved, and he has remained off the metoprolol since prior to PPM implant.   He walks for exercise. He does 3000 - 4000 steps per day, but he plans to increase this as the weather gets nicer.  September 2023 - Patient returns today for follow-up in his usual state of health. He has no new complaints. Recent labs were within normal limits. A1c seen to be 7.4% and LDL 54 mg/dL.  March 2024 - Patient returns today for follow-up in his usual state of health.  Recent labs showed elevated A1c at 8.3% and LDL that is well controlled. He recently returned from a trip to [place] with his two brothers. They are all in their 70s now.

## 2024-09-12 NOTE — CARDIOLOGY SUMMARY
[de-identified] : ECG from 12/23/2022: Normal sinus rhythm with 2:1 AV block, right bundle branch block, NH: 218ms on conducted beats\par  ECG from 12/5/2022: Sinus bradycardia with atrial bigeminy, right bundle branch block (QRSd: 140), NH: 212, left axis deviation\par  ECG from 11/11/2022: Sinus shonna at 48 with NH: 256, RBBB (QRSd: 132ms), normal axis [de-identified] : [employer] from 8/27/2022-9/2/2022: min HR: 50, mean HR: 85, max HR: 140, no patient triggered events, Mobitz I AV block occurred, 2% APCs, 12% PVCs [de-identified] : Stress test from 12/6/2022: Negative ECG stress test for ischemia after pharmacologic stress, equivocal myocardial perfusion imaging, overall normal LVSF without regional WMAs [de-identified] : TTE from 8/6/2022: EF: 67%, trace TR, normal LA and RA, no ASD, normal RVSF, no pericardial effusion [de-identified] : 12/30/2022 [employer] Accolade MRI EL dual chamber PPM placed by Gerson Yates MD at [facility]

## 2024-10-23 ENCOUNTER — APPOINTMENT (OUTPATIENT)
Dept: ELECTROPHYSIOLOGY | Facility: CLINIC | Age: 74
End: 2024-10-23
Payer: MEDICARE

## 2024-10-23 ENCOUNTER — NON-APPOINTMENT (OUTPATIENT)
Age: 74
End: 2024-10-23

## 2024-10-23 PROCEDURE — 93294 REM INTERROG EVL PM/LDLS PM: CPT

## 2024-10-23 PROCEDURE — 93296 REM INTERROG EVL PM/IDS: CPT

## 2025-01-07 NOTE — PRE-ANESTHESIA EVALUATION ADULT - BP NONINVASIVE SYSTOLIC (MM HG)
[facility] General Surgery Clinic Note    HPI:   Rogelio Johnson is a 77 yo male with PMHx of prostate cancer (s/p Lupron, radiation), DM on insulin, and HTN presenting today for postop visit after lap cholecystectomy 12/23/24 for symptomatic cholelithiasis. He reports feeling well post op. Tolerating diet, ambulating, having Bms. Denies nausea or vomiting. He reports improvement in his RUQ pain.     PMH:   Past Medical History:   Diagnosis Date    Coronary artery disease     Diabetes mellitus     Hyperlipidemia     Hypertension     Personal history of colonic polyps     Sleep apnea, unspecified       Meds:   Current Outpatient Medications:     amLODIPine (NORVASC) 2.5 MG tablet, Take 1 tablet (2.5 mg total) by mouth once daily., Disp: 90 tablet, Rfl: 1    aspirin (ECOTRIN) 81 MG EC tablet, Take 1 tablet (81 mg total) by mouth once daily., Disp: , Rfl: 0    atorvastatin (LIPITOR) 80 MG tablet, Take 1 tablet (80 mg total) by mouth once daily., Disp: 90 tablet, Rfl: 2    busPIRone (BUSPAR) 7.5 MG tablet, Take 1 tablet (7.5 mg total) by mouth 2 (two) times daily as needed (anxiety)., Disp: 60 tablet, Rfl: 6    carvediloL (COREG) 6.25 MG tablet, Take 6.25 mg by mouth 2 (two) times daily., Disp: , Rfl:     docusate sodium (COLACE) 100 MG capsule, Take 100 mg by mouth., Disp: , Rfl:     empagliflozin (JARDIANCE) 10 mg tablet, Take 1 tablet (10 mg total) by mouth every morning., Disp: 90 tablet, Rfl: 2    ezetimibe (ZETIA) 10 mg tablet, Take 1 tablet (10 mg total) by mouth once daily., Disp: 90 tablet, Rfl: 2    ferrous sulfate (FEROSUL) 325 mg (65 mg iron) Tab tablet, Take 1 tablet (325 mg total) by mouth once daily., Disp: 90 tablet, Rfl: 2    finasteride (PROSCAR) 5 mg tablet, Take 1 tablet (5 mg total) by mouth once daily., Disp: 90 tablet, Rfl: 3    fluticasone propionate (FLONASE) 50 mcg/actuation nasal spray, 2 sprays (100 mcg total) by Each Nostril route once daily. (Patient taking differently: 2 sprays by Each Nostril  route as needed for Allergies.), Disp: 15.8 mL, Rfl: 6    hydrALAZINE (APRESOLINE) 25 MG tablet, Take 1 tablet (25 mg total) by mouth every 12 (twelve) hours., Disp: 180 tablet, Rfl: 2    insulin glargine U-100, Lantus, (LANTUS SOLOSTAR U-100 INSULIN) 100 unit/mL (3 mL) InPn pen, Administer 68 units SC q AM and 62 units SC q HS. Rotate injection sites., Disp: 126 mL, Rfl: 1    metFORMIN (GLUCOPHAGE) 1000 MG tablet, Take 1 tablet (1,000 mg total) by mouth 2 (two) times daily with meals., Disp: 180 tablet, Rfl: 2    mupirocin (BACTROBAN) 2 % ointment, Apply topically 2 (two) times daily., Disp: 30 g, Rfl: 1    nitroGLYCERIN (NITROSTAT) 0.4 MG SL tablet, Place 0.4 mg under the tongue every 5 (five) minutes as needed for Chest pain., Disp: , Rfl:     oxyCODONE (ROXICODONE) 5 MG immediate release tablet, Take 1 tablet (5 mg total) by mouth every 4 (four) hours as needed for Pain., Disp: 5 tablet, Rfl: 0    tadalafiL (CIALIS) 10 MG tablet, Take 1 tablet (10 mg total) by mouth daily as needed for Erectile Dysfunction., Disp: 10 tablet, Rfl: 11    TRADJENTA 5 mg Tab tablet, Take 1 tablet (5 mg total) by mouth once daily., Disp: 90 tablet, Rfl: 2  No current facility-administered medications for this visit.    Facility-Administered Medications Ordered in Other Visits:     0.9%  NaCl infusion, , Intravenous, Once, Baljit Sánchez MD  Allergies: Review of patient's allergies indicates:  No Known Allergies  Social History:   Social History     Tobacco Use    Smoking status: Never     Passive exposure: Never    Smokeless tobacco: Never   Substance Use Topics    Alcohol use: Not Currently    Drug use: Never     Family History:   Family History   Problem Relation Name Age of Onset    Other (Bile Duct) Mother      Gallbladder disease Mother      Diabetes Mellitus Mother      Hypertension Mother      Cataracts Father      Heart attack Father      Heart disease Father       Surgical History:   Past Surgical History:   Procedure  Laterality Date    A-V CARDIAC PACEMAKER INSERTION N/A 11/09/2023    Procedure: INSERTION, CARDIAC PACEMAKER, DUAL CHAMBER;  Surgeon: Baljit Sánchez MD;  Location: [facility] CATH LAB;  Service: Cardiology;  Laterality: N/A;  DUAL CHAMBER PPM (SJM)    COLON RESECTION      COLONOSCOPY      CORONARY ARTERY BYPASS GRAFT (CABG)      LAPAROSCOPIC CHOLECYSTECTOMY N/A 12/23/2024    Procedure: CHOLECYSTECTOMY, LAPAROSCOPIC;  Surgeon: Donn Cheema Jr., MD;  Location: [facility] OR;  Service: General;  Laterality: N/A;    TRANSRECTAL BIOPSY OF PROSTATE WITH ULTRASOUND GUIDANCE Bilateral 01/02/2024    Procedure: BIOPSY, PROSTATE, RECTAL APPROACH, WITH US GUIDANCE;  Surgeon: Miracle Pierre DO;  Location: [facility] ENDOSCOPY;  Service: Urology;  Laterality: Bilateral;     Objective:    Vitals:  Vitals:    01/07/25 0948   BP: (!) 146/64   Pulse: 65   Resp: 18   Temp: 97.7 °F (36.5 °C)        Physical Exam:  Gen: NAD  Neuro: awake, alert, answering questions appropriately  CV: RR  Resp: non-labored breathing on RA  Abd: soft, ND, NT. Incisions c/d/i  Ext: moves all 4 spontaneously and purposefully  Skin: warm, well perfused    Imaging:  10/29/24:  EXAMINATION:  US ABDOMEN LIMITED     CLINICAL HISTORY:  cholelithiasis;     COMPARISON:  CT yesterday.     FINDINGS:  Grayscale, color and spectral doppler evaluation of the right upper quadrant.     The pancreas is obscured.  The IVC is obscured.     Liver is not significantly enlarged.  There is increased hepatic parenchymal echogenicity.  Normal hepatopetal flow is noted in the portal vein.     There are gallstones.  No significant gallbladder wall thickening or pericholecystic fluid.  The common bile duct is normal in caliber  and measures 5 mm.     Right kidney measures 11 cm in length. No hydronephrosis.     Impression:     1. Cholelithiasis without convincing sonographic evidence of acute cholecystitis.  2. No biliary ductal dilatation.  3. Hepatic steatosis.    Path  Acute cholecystitis,  cholelithiasis    Assessment/Plan:  Rogelio Johnson is a 75 yo male with PMHx of prostate cancer (s/p Lupron, radiation), DM on insulin, and HTN presenting today for postop visit after lap cholecystectomy 12/23/24 for symptomatic cholelithiasis.     - path reviewed with patient  - ok to start resuming normal activities  - RTC PRN    Alicia Powell MD  [employer] General Surgery PGY-3       [phone]

## 2025-01-23 ENCOUNTER — NON-APPOINTMENT (OUTPATIENT)
Age: 75
End: 2025-01-23

## 2025-01-23 ENCOUNTER — APPOINTMENT (OUTPATIENT)
Dept: ELECTROPHYSIOLOGY | Facility: CLINIC | Age: 75
End: 2025-01-23
Payer: MEDICARE

## 2025-01-23 PROCEDURE — 93296 REM INTERROG EVL PM/IDS: CPT

## 2025-01-23 PROCEDURE — 93294 REM INTERROG EVL PM/LDLS PM: CPT

## 2025-03-19 ENCOUNTER — APPOINTMENT (OUTPATIENT)
Dept: CARDIOLOGY | Facility: CLINIC | Age: 75
End: 2025-03-19

## 2025-03-25 ENCOUNTER — NON-APPOINTMENT (OUTPATIENT)
Age: 75
End: 2025-03-25

## 2025-03-25 ENCOUNTER — APPOINTMENT (OUTPATIENT)
Dept: CARDIOLOGY | Facility: CLINIC | Age: 75
End: 2025-03-25
Payer: MEDICARE

## 2025-03-25 VITALS
HEART RATE: 102 BPM | SYSTOLIC BLOOD PRESSURE: 126 MMHG | WEIGHT: 173 LBS | BODY MASS INDEX: 27.15 KG/M2 | DIASTOLIC BLOOD PRESSURE: 70 MMHG | HEIGHT: 67 IN

## 2025-03-25 DIAGNOSIS — I10 ESSENTIAL (PRIMARY) HYPERTENSION: ICD-10-CM

## 2025-03-25 DIAGNOSIS — E78.5 HYPERLIPIDEMIA, UNSPECIFIED: ICD-10-CM

## 2025-03-25 DIAGNOSIS — I49.9 CARDIAC ARRHYTHMIA, UNSPECIFIED: ICD-10-CM

## 2025-03-25 DIAGNOSIS — Z95.0 PRESENCE OF CARDIAC PACEMAKER: ICD-10-CM

## 2025-03-25 DIAGNOSIS — E11.9 TYPE 2 DIABETES MELLITUS W/OUT COMPLICATIONS: ICD-10-CM

## 2025-03-25 PROCEDURE — 99214 OFFICE O/P EST MOD 30 MIN: CPT | Mod: 25

## 2025-03-25 PROCEDURE — 93000 ELECTROCARDIOGRAM COMPLETE: CPT

## 2025-04-01 ENCOUNTER — APPOINTMENT (OUTPATIENT)
Dept: CARDIOLOGY | Facility: CLINIC | Age: 75
End: 2025-04-01
Payer: MEDICARE

## 2025-04-01 PROCEDURE — 93306 TTE W/DOPPLER COMPLETE: CPT

## 2025-04-24 ENCOUNTER — APPOINTMENT (OUTPATIENT)
Dept: ELECTROPHYSIOLOGY | Facility: CLINIC | Age: 75
End: 2025-04-24
Payer: MEDICARE

## 2025-04-24 ENCOUNTER — NON-APPOINTMENT (OUTPATIENT)
Age: 75
End: 2025-04-24

## 2025-04-24 PROCEDURE — 93296 REM INTERROG EVL PM/IDS: CPT

## 2025-04-24 PROCEDURE — 93294 REM INTERROG EVL PM/LDLS PM: CPT

## 2025-07-24 ENCOUNTER — APPOINTMENT (OUTPATIENT)
Dept: ELECTROPHYSIOLOGY | Facility: CLINIC | Age: 75
End: 2025-07-24
Payer: MEDICARE

## 2025-07-24 ENCOUNTER — NON-APPOINTMENT (OUTPATIENT)
Age: 75
End: 2025-07-24

## 2025-07-24 PROCEDURE — 93296 REM INTERROG EVL PM/IDS: CPT

## 2025-07-24 PROCEDURE — 93294 REM INTERROG EVL PM/LDLS PM: CPT

## 2025-09-15 ENCOUNTER — NON-APPOINTMENT (OUTPATIENT)
Age: 75
End: 2025-09-15

## 2025-09-16 ENCOUNTER — APPOINTMENT (OUTPATIENT)
Dept: GASTROENTEROLOGY | Facility: CLINIC | Age: 75
End: 2025-09-16
Payer: MEDICARE

## 2025-09-16 ENCOUNTER — NON-APPOINTMENT (OUTPATIENT)
Age: 75
End: 2025-09-16

## 2025-09-16 VITALS
HEIGHT: 67 IN | DIASTOLIC BLOOD PRESSURE: 75 MMHG | SYSTOLIC BLOOD PRESSURE: 118 MMHG | WEIGHT: 164 LBS | HEART RATE: 96 BPM | BODY MASS INDEX: 25.74 KG/M2 | OXYGEN SATURATION: 96 %

## 2025-09-16 DIAGNOSIS — R19.5 OTHER FECAL ABNORMALITIES: ICD-10-CM

## 2025-09-16 PROCEDURE — 99204 OFFICE O/P NEW MOD 45 MIN: CPT

## 2025-09-16 RX ORDER — SODIUM SULFATE, POTASSIUM SULFATE AND MAGNESIUM SULFATE 1.6; 3.13; 17.5 G/177ML; G/177ML; G/177ML
17.5-3.13-1.6 SOLUTION ORAL
Qty: 1 | Refills: 0 | Status: ACTIVE | COMMUNITY
Start: 2025-09-16 | End: 1900-01-01

## 2025-09-25 PROBLEM — Z01.810 PREOPERATIVE CARDIOVASCULAR EXAMINATION: Status: ACTIVE | Noted: 2025-09-25
